# Patient Record
Sex: FEMALE | Race: WHITE | NOT HISPANIC OR LATINO | Employment: PART TIME | URBAN - METROPOLITAN AREA
[De-identification: names, ages, dates, MRNs, and addresses within clinical notes are randomized per-mention and may not be internally consistent; named-entity substitution may affect disease eponyms.]

---

## 2022-05-18 ENCOUNTER — TELEPHONE (OUTPATIENT)
Dept: OTHER | Facility: CLINIC | Age: 42
End: 2022-05-18

## 2022-05-18 DIAGNOSIS — I77.89 POPLITEAL ARTERY ENTRAPMENT SYNDROME (H): Primary | ICD-10-CM

## 2022-05-18 NOTE — TELEPHONE ENCOUNTER
[facility]    Who is the name of the provider?:  Alessia      What is the location you see this provider at?: [place]    Reason for call:  Surgery 10 years ago for right leg popliteal entrapment.  Having same symptoms in right leg, pain, aching, swollen.      Can we leave a detailed message on this number?  YES

## 2022-05-20 NOTE — TELEPHONE ENCOUNTER
Established with:  Fox Aggarwal  Vascular surgery history: S/P right popliteal artery entrapment release on 5/23/12  LOV: pt did not follow up after surgery, appears pt was scheduled 7/10/14, but did not show or cancelled appt  History of DVT:  No  Extremity: Right leg   Duration of symptoms: Approximately one week.  She reports she has attempted to run/walk, right calf dull ache, visibly swollen, difficult to remove pant leg due to swelling.  Swelling: Yes  Temp of extremity:  Normal, Warm  Firm: Yes, bilaterally  Redness:   No    Primary recommendation made to patient is for her to present to  for further evaluation.  Pt states she doesn't think it's DVT and she is prone to shin splints.  She states the same symptoms happened in the Fall and eventually went away.  She has used compression socks intermittently and this did seem to help somewhat.    Secondary recommendation is for patient to use compression socks daily, don first thing in morning to help with swelling.    Will discuss with Dr. Aggarwal any imaging needed.  Advised pt would most likely start with an US and patient insisted the only imaging that showed her diagnosis was an MRI.    Pt verbalized understanding and is aware she will not receive a call back until later next week with update.    Mitzi Davidson, BSN, RN-[facility] [place]

## 2022-05-20 NOTE — TELEPHONE ENCOUNTER
[facility]    Who is the name of the provider?:  Alessia      What is the location you see this provider at?: [place]    Reason for call:  She has not had a response to her call on 5/18/22.    She is having the same symptoms.  Her right calf if swollon.    Please call and advise next steps.    Can we leave a detailed message on this number?  YES

## 2022-06-08 NOTE — TELEPHONE ENCOUNTER
Per Dr. Aggarwal, VORB for right LE arterial/venous US.  Pt will need in clinic visit with Dr. Aggarwal at next available.    Routing to scheduling to contact patient to coordinate above.  Appt note in order comments.    Mitzi Davidson, PHILLIPN, RN-[facility] [place]

## 2022-06-08 NOTE — TELEPHONE ENCOUNTER
Patient called and stated that symptoms have improved and does not see the need to come in at this time .    Also stated in middle of  changing health  insurance and wants to hold on making the appointments till that is finalized.

## 2022-07-09 ENCOUNTER — HEALTH MAINTENANCE LETTER (OUTPATIENT)
Age: 42
End: 2022-07-09

## 2022-09-03 ENCOUNTER — HEALTH MAINTENANCE LETTER (OUTPATIENT)
Age: 42
End: 2022-09-03

## 2023-07-22 ENCOUNTER — HEALTH MAINTENANCE LETTER (OUTPATIENT)
Age: 43
End: 2023-07-22

## 2023-12-14 ENCOUNTER — TELEPHONE (OUTPATIENT)
Dept: OTHER | Facility: CLINIC | Age: 43
End: 2023-12-14
Payer: COMMERCIAL

## 2023-12-14 DIAGNOSIS — I77.89 POPLITEAL ARTERY ENTRAPMENT SYNDROME (H): Primary | ICD-10-CM

## 2023-12-14 NOTE — TELEPHONE ENCOUNTER
[facility]    Who is the name of the provider?:  SAUL JEWELL   What is the location you see this provider at/preferred location?: [place]  Person calling / Facility: Neema Gutiérrez  Phone number:  [phone]  Nurse call back needed:  YES     Reason for call:  Patient describes pronounced swelling and pain in Right leg. Procedure with Omlie in 2011 or 2012. Asking for an US or MRI and to follow up based on current symptoms. Does not believe its a DVT, wears compression stockings.    Pharmacy location:  Data Unavailable  Outside Imaging: n/a   Can we leave a detailed message on this number?  YES     12/14/2023, 1:29 PM

## 2023-12-14 NOTE — TELEPHONE ENCOUNTER
Per chart review, pt reported same symptoms on 5/20/22 and canceled follow up.    Pt also did not follow up after original PAE surgery on 5/23/12.    Attempted to contact patient and LVM requesting call back at her convenience to discuss further.    MALLY Kyle, RN, [facility]    ADDENDUM:   Patient immediately returned call.  States the symptoms have been present for a few weeks.  Right leg feels achy and heavy.  Activity does not really change the intensity, it seems to always be present.  Denies any redness or excessive warmth at right leg.    Advised patient will repeat PINEDA, arterial/venous US and have her see Dr. Aggarwal.     Routing to scheduling to coordinate the above at next available.  Appt note in order comments.    MALLY Kyle, RN, [facility]

## 2023-12-15 NOTE — TELEPHONE ENCOUNTER
Patient scheduled for ultrasounds and a visit with Dr Aggarwal. Per patient request ultrasounds are done at next available which did not coordinate with provider clinic schedule or patient work schedule. Patient is scheduled at next available in clinic visit that best coordinates with her schedule.

## 2023-12-29 ENCOUNTER — HOSPITAL ENCOUNTER (OUTPATIENT)
Dept: ULTRASOUND IMAGING | Facility: CLINIC | Age: 43
Discharge: HOME | End: 2023-12-29
Attending: SURGERY
Payer: COMMERCIAL

## 2023-12-29 DIAGNOSIS — I77.89 POPLITEAL ARTERY ENTRAPMENT SYNDROME (H): ICD-10-CM

## 2023-12-29 PROCEDURE — 93971 EXTREMITY STUDY: CPT | Mod: RT

## 2023-12-29 PROCEDURE — 93926 LOWER EXTREMITY STUDY: CPT | Mod: RT

## 2023-12-29 PROCEDURE — 93924 LWR XTR VASC STDY BILAT: CPT

## 2024-01-02 NOTE — PROGRESS NOTES
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  This is isolated to her right leg.    -- 5/23/2012: Mobilization right popliteal artery and vein with detachment of portion of the medial head of the gastrocnemius with partial excision via posterior approach.    No follow-up since that time.    She reports that she had excellent results after the first surgery.  She was running  10 k. within several weeks had done well.    She works on a regular basis but not extensively.  She noted in May 2022 some swelling in her leg.  Her coworkers thought that she could have a DVT but did not have this evaluated.  Wore compression and this resolved.  She started noticing more issues this October/November where she will notice some achiness and mild swelling to right calf usually associate with activities.  Only recent within the last several weeks if she decreased her activities.  No significant pain with running or activities.  With her past history she wanted this evaluated.    Again like originally was only her right leg and not ever her left.    PMH: No chronic medications.    Exam: Alert and appropriate.  Normal affect.  Ambulatory.  Conversant.  Blood pressure 125/76 right 120/84 left.  Pulse 92 regular   Chest= clear  Cardiovascular= regular rate  Well-healed right popliteal incision.  No calf swelling.  Normal sensation and pulses.    12/29/2023 testing: PINEDA 1.24 on the right 1.19 on the left with triphasic waveforms.  No decrease with exercise.        Dynamic testing of the popliteal artery and vein with maneuvers is normal bilaterally with no compression at any level.  No evidence of DVT also.            IMPRESSION:  More recent symptoms on the right leg similar but definitely not as extensive as they were over a decade ago.  Scar tissue from the original surgery is extremely unlikely since this normally develops within several months of the original  surgery and not this late.  This certainly could be related to the plantaris muscle and soleus fascial band that was not treated the original surgery but we find no evidence at all of the compression making this less likely will not completely ruled out.    I discussed this with her today.  I do not feel that any intervention such as surgery is indicated at the present time.  She will see how she does clinically and contact me if her symptoms do not improve or worsen.    Over 20 minutes with patient today reviewing past records and most recent testing along with recommendations    Fox Aggarwal MD  This note was created using Dragon voice recognition software which may result in transcription errors.

## 2024-01-04 ENCOUNTER — OFFICE VISIT (OUTPATIENT)
Dept: OTHER | Facility: CLINIC | Age: 44
End: 2024-01-04
Attending: SURGERY
Payer: COMMERCIAL

## 2024-01-04 VITALS — OXYGEN SATURATION: 100 % | DIASTOLIC BLOOD PRESSURE: 76 MMHG | SYSTOLIC BLOOD PRESSURE: 125 MMHG | HEART RATE: 93 BPM

## 2024-01-04 DIAGNOSIS — M79.89 CALF SWELLING: Primary | ICD-10-CM

## 2024-01-04 DIAGNOSIS — I77.89 POPLITEAL ARTERY ENTRAPMENT SYNDROME (H): ICD-10-CM

## 2024-01-04 PROCEDURE — 99213 OFFICE O/P EST LOW 20 MIN: CPT | Performed by: SURGERY

## 2024-01-04 PROCEDURE — 99203 OFFICE O/P NEW LOW 30 MIN: CPT | Performed by: SURGERY

## 2024-01-04 NOTE — PROGRESS NOTES
Patient is here to discuss consult    /76 (BP Location: Right arm, Patient Position: Chair, Cuff Size: Adult Regular)   Pulse 93   SpO2 100%     Questions patient would like addressed today are: N/A.    Refills are needed: No    Has homecare services and agency name:  Manisha AGUIRRE

## 2024-02-17 ENCOUNTER — HEALTH MAINTENANCE LETTER (OUTPATIENT)
Age: 44
End: 2024-02-17

## 2025-03-08 ENCOUNTER — HEALTH MAINTENANCE LETTER (OUTPATIENT)
Age: 45
End: 2025-03-08

## 2025-04-20 ENCOUNTER — HEALTH MAINTENANCE LETTER (OUTPATIENT)
Age: 45
End: 2025-04-20